# Patient Record
Sex: MALE | Race: WHITE | Employment: FULL TIME | URBAN - METROPOLITAN AREA
[De-identification: names, ages, dates, MRNs, and addresses within clinical notes are randomized per-mention and may not be internally consistent; named-entity substitution may affect disease eponyms.]

---

## 2018-01-08 ENCOUNTER — HOSPITAL ENCOUNTER (EMERGENCY)
Age: 53
Discharge: HOME | End: 2018-01-09
Attending: EMERGENCY MEDICINE
Payer: COMMERCIAL

## 2018-01-08 ENCOUNTER — APPOINTMENT (OUTPATIENT)
Dept: CT IMAGING | Age: 53
End: 2018-01-08
Attending: EMERGENCY MEDICINE
Payer: COMMERCIAL

## 2018-01-08 VITALS
RESPIRATION RATE: 18 BRPM | HEART RATE: 68 BPM | TEMPERATURE: 97 F | SYSTOLIC BLOOD PRESSURE: 123 MMHG | OXYGEN SATURATION: 100 % | DIASTOLIC BLOOD PRESSURE: 84 MMHG

## 2018-01-08 DIAGNOSIS — R10.13 ABDOMINAL PAIN, EPIGASTRIC: Primary | ICD-10-CM

## 2018-01-08 LAB
ALBUMIN SERPL-MCNC: 4.1 G/DL (ref 3.4–5)
ALBUMIN/GLOB SERPL: 1.3 {RATIO} (ref 0.8–1.7)
ALP SERPL-CCNC: 80 U/L (ref 45–117)
ALT SERPL-CCNC: 31 U/L (ref 16–61)
ANION GAP SERPL CALC-SCNC: 4 MMOL/L (ref 3–18)
AST SERPL-CCNC: 28 U/L (ref 15–37)
ATRIAL RATE: 50 BPM
BASOPHILS # BLD: 0 K/UL (ref 0–0.1)
BASOPHILS NFR BLD: 0 % (ref 0–2)
BILIRUB SERPL-MCNC: 0.3 MG/DL (ref 0.2–1)
BUN SERPL-MCNC: 27 MG/DL (ref 7–18)
BUN/CREAT SERPL: 32 (ref 12–20)
CALCIUM SERPL-MCNC: 8.6 MG/DL (ref 8.5–10.1)
CALCULATED P AXIS, ECG09: 25 DEGREES
CALCULATED R AXIS, ECG10: 58 DEGREES
CALCULATED T AXIS, ECG11: 37 DEGREES
CHLORIDE SERPL-SCNC: 105 MMOL/L (ref 100–108)
CO2 SERPL-SCNC: 31 MMOL/L (ref 21–32)
CREAT SERPL-MCNC: 0.85 MG/DL (ref 0.6–1.3)
DIAGNOSIS, 93000: NORMAL
DIFFERENTIAL METHOD BLD: ABNORMAL
EOSINOPHIL # BLD: 0.2 K/UL (ref 0–0.4)
EOSINOPHIL NFR BLD: 4 % (ref 0–5)
ERYTHROCYTE [DISTWIDTH] IN BLOOD BY AUTOMATED COUNT: 11.9 % (ref 11.6–14.5)
GLOBULIN SER CALC-MCNC: 3.1 G/DL (ref 2–4)
GLUCOSE SERPL-MCNC: 99 MG/DL (ref 74–99)
HCT VFR BLD AUTO: 38.6 % (ref 36–48)
HGB BLD-MCNC: 13.5 G/DL (ref 13–16)
LIPASE SERPL-CCNC: 125 U/L (ref 73–393)
LYMPHOCYTES # BLD: 1.6 K/UL (ref 0.9–3.6)
LYMPHOCYTES NFR BLD: 35 % (ref 21–52)
MCH RBC QN AUTO: 30.1 PG (ref 24–34)
MCHC RBC AUTO-ENTMCNC: 35 G/DL (ref 31–37)
MCV RBC AUTO: 86.2 FL (ref 74–97)
MONOCYTES # BLD: 0.4 K/UL (ref 0.05–1.2)
MONOCYTES NFR BLD: 9 % (ref 3–10)
NEUTS SEG # BLD: 2.3 K/UL (ref 1.8–8)
NEUTS SEG NFR BLD: 52 % (ref 40–73)
P-R INTERVAL, ECG05: 146 MS
PLATELET # BLD AUTO: 255 K/UL (ref 135–420)
PMV BLD AUTO: 10 FL (ref 9.2–11.8)
POTASSIUM SERPL-SCNC: 3.5 MMOL/L (ref 3.5–5.5)
PROT SERPL-MCNC: 7.2 G/DL (ref 6.4–8.2)
Q-T INTERVAL, ECG07: 456 MS
QRS DURATION, ECG06: 116 MS
QTC CALCULATION (BEZET), ECG08: 415 MS
RBC # BLD AUTO: 4.48 M/UL (ref 4.7–5.5)
SODIUM SERPL-SCNC: 140 MMOL/L (ref 136–145)
VENTRICULAR RATE, ECG03: 50 BPM
WBC # BLD AUTO: 4.5 K/UL (ref 4.6–13.2)

## 2018-01-08 PROCEDURE — 74011250636 HC RX REV CODE- 250/636: Performed by: EMERGENCY MEDICINE

## 2018-01-08 PROCEDURE — 99283 EMERGENCY DEPT VISIT LOW MDM: CPT

## 2018-01-08 PROCEDURE — 80053 COMPREHEN METABOLIC PANEL: CPT | Performed by: EMERGENCY MEDICINE

## 2018-01-08 PROCEDURE — 85025 COMPLETE CBC W/AUTO DIFF WBC: CPT | Performed by: EMERGENCY MEDICINE

## 2018-01-08 PROCEDURE — 74011636320 HC RX REV CODE- 636/320: Performed by: EMERGENCY MEDICINE

## 2018-01-08 PROCEDURE — 93005 ELECTROCARDIOGRAM TRACING: CPT

## 2018-01-08 PROCEDURE — 83690 ASSAY OF LIPASE: CPT | Performed by: EMERGENCY MEDICINE

## 2018-01-08 PROCEDURE — 74177 CT ABD & PELVIS W/CONTRAST: CPT

## 2018-01-08 RX ADMIN — IOPAMIDOL 100 ML: 612 INJECTION, SOLUTION INTRAVENOUS at 11:29

## 2018-01-08 RX ADMIN — SODIUM CHLORIDE 1000 ML: 900 INJECTION, SOLUTION INTRAVENOUS at 07:51

## 2018-01-08 NOTE — DISCHARGE INSTRUCTIONS
Abdominal Pain: Care Instructions  Your Care Instructions    Abdominal pain has many possible causes. Some aren't serious and get better on their own in a few days. Others need more testing and treatment. If your pain continues or gets worse, you need to be rechecked and may need more tests to find out what is wrong. You may need surgery to correct the problem. Don't ignore new symptoms, such as fever, nausea and vomiting, urination problems, pain that gets worse, and dizziness. These may be signs of a more serious problem. Your doctor may have recommended a follow-up visit in the next 8 to 12 hours. If you are not getting better, you may need more tests or treatment. The doctor has checked you carefully, but problems can develop later. If you notice any problems or new symptoms, get medical treatment right away. Follow-up care is a key part of your treatment and safety. Be sure to make and go to all appointments, and call your doctor if you are having problems. It's also a good idea to know your test results and keep a list of the medicines you take. How can you care for yourself at home? · Rest until you feel better. · To prevent dehydration, drink plenty of fluids, enough so that your urine is light yellow or clear like water. Choose water and other caffeine-free clear liquids until you feel better. If you have kidney, heart, or liver disease and have to limit fluids, talk with your doctor before you increase the amount of fluids you drink. · If your stomach is upset, eat mild foods, such as rice, dry toast or crackers, bananas, and applesauce. Try eating several small meals instead of two or three large ones. · Wait until 48 hours after all symptoms have gone away before you have spicy foods, alcohol, and drinks that contain caffeine. · Do not eat foods that are high in fat. · Avoid anti-inflammatory medicines such as aspirin, ibuprofen (Advil, Motrin), and naproxen (Aleve).  These can cause stomach upset. Talk to your doctor if you take daily aspirin for another health problem. When should you call for help? Call 911 anytime you think you may need emergency care. For example, call if:  ? · You passed out (lost consciousness). ? · You pass maroon or very bloody stools. ? · You vomit blood or what looks like coffee grounds. ? · You have new, severe belly pain. ?Call your doctor now or seek immediate medical care if:  ? · Your pain gets worse, especially if it becomes focused in one area of your belly. ? · You have a new or higher fever. ? · Your stools are black and look like tar, or they have streaks of blood. ? · You have unexpected vaginal bleeding. ? · You have symptoms of a urinary tract infection. These may include:  ¨ Pain when you urinate. ¨ Urinating more often than usual.  ¨ Blood in your urine. ? · You are dizzy or lightheaded, or you feel like you may faint. ? Watch closely for changes in your health, and be sure to contact your doctor if:  ? · You are not getting better after 1 day (24 hours). Where can you learn more? Go to http://dane-jaquan.info/. Enter Z433 in the search box to learn more about \"Abdominal Pain: Care Instructions. \"  Current as of: March 20, 2017  Content Version: 11.4  © 8857-0793 [employer]. Care instructions adapted under license by [employer] (which disclaims liability or warranty for this information). If you have questions about a medical condition or this instruction, always ask your healthcare professional. [place] [address] any warranty or liability for your use of this information.

## 2018-01-08 NOTE — ED TRIAGE NOTES
Pt stating he is having abd. Pain for the last 3 weeks. Pt stating that his pain has increased. Pt is AOX4; denies chest pain and SOB. Pt is ambulatory.

## 2018-01-08 NOTE — LETTER
NOTIFICATION RETURN TO WORK / SCHOOL 
 
1/8/2018 12:50 PM 
 
Mr. Carlos Mock [address] To Whom It May Concern: 
 
Carlos Mock is currently under the care of [facility] EMERGENCY DEPT. He will return to work/school on: 01/11/18 If there are questions or concerns please have the patient contact our office. Sincerely, Claire Kovacs 22., RN

## 2018-01-08 NOTE — ED PROVIDER NOTES
EMERGENCY DEPARTMENT HISTORY AND PHYSICAL EXAM    7:54 AM      Date: 1/8/2018  Patient Name: Dontrell Patten    History of Presenting Illness     No chief complaint on file. History Provided By: Patient    Chief Complaint: Abd pain  Duration:  Weeks  Timing:  Worsening   Location:    Quality: Dull  Severity: Mild  Modifying Factors: None  Associated Symptoms: abd bloating      Additional History (Context): Dontrell Patten is a 47 y.o. male who presents to the ED complaining of worsening dull abd pain that began 3 weeks ago. The patient is also complaining of associated abd bloating. He reports a history of pancreatitis and notes that his current pain feels like his previous pancreatitis but not as severe. He also reports a history of multiple abx surgeries including a cholecystectomy. Patient denies NVD,  Fever, urinary sx, testicular pain or swelling, h/o of kidney stones, and additional complaints or concerns. PCP: None        Past History     Past Medical History:  No past medical history on file. Past Surgical History:  No past surgical history on file. Family History:  No family history on file. Social History:  Social History   Substance Use Topics    Smoking status: Not on file    Smokeless tobacco: Not on file    Alcohol use Not on file       Allergies: Allergies   Allergen Reactions    Codeine Itching         Review of Systems       Review of Systems   Constitutional: Negative for fever. HENT: Negative for congestion. Respiratory: Negative for cough and shortness of breath. Cardiovascular: Negative for chest pain and leg swelling. Gastrointestinal: Positive for abdominal distention and abdominal pain. Negative for nausea and vomiting. Genitourinary: Negative for dysuria, flank pain, scrotal swelling and testicular pain. Neurological: Negative for light-headedness and headaches. All other systems reviewed and are negative.         Physical Exam     Visit Vitals    /84 (BP 1 Location: Left arm, BP Patient Position: At rest)    Pulse 68    Temp 97 °F (36.1 °C)    Resp 18    SpO2 100%         Physical Exam   Constitutional: He is oriented to person, place, and time. HENT:   Head: Atraumatic. Eyes: Conjunctivae are normal.   Neck: Neck supple. Cardiovascular: Normal rate, regular rhythm and normal heart sounds. Pulmonary/Chest: Effort normal and breath sounds normal. No respiratory distress. He exhibits no tenderness. Abdominal: Soft. Bowel sounds are normal. He exhibits distension. There is no tenderness. There is no rebound and no guarding. No cva ttp bilaterally   Musculoskeletal: Normal range of motion. He exhibits no edema or tenderness. Neurological: He is alert and oriented to person, place, and time. Skin: Skin is warm and dry. Psychiatric: He has a normal mood and affect. Nursing note and vitals reviewed. Diagnostic Study Results     Labs -  Recent Results (from the past 12 hour(s))   EKG, 12 LEAD, INITIAL    Collection Time: 01/08/18  8:47 AM   Result Value Ref Range    Ventricular Rate 50 BPM    Atrial Rate 50 BPM    P-R Interval 146 ms    QRS Duration 116 ms    Q-T Interval 456 ms    QTC Calculation (Bezet) 415 ms    Calculated P Axis 25 degrees    Calculated R Axis 58 degrees    Calculated T Axis 37 degrees    Diagnosis       Sinus bradycardia  Otherwise normal ECG  No previous ECGs available     CBC WITH AUTOMATED DIFF    Collection Time: 01/08/18  9:10 AM   Result Value Ref Range    WBC 4.5 (L) 4.6 - 13.2 K/uL    RBC 4.48 (L) 4.70 - 5.50 M/uL    HGB 13.5 13.0 - 16.0 g/dL    HCT 38.6 36.0 - 48.0 %    MCV 86.2 74.0 - 97.0 FL    MCH 30.1 24.0 - 34.0 PG    MCHC 35.0 31.0 - 37.0 g/dL    RDW 11.9 11.6 - 14.5 %    PLATELET 789 372 - 695 K/uL    MPV 10.0 9.2 - 11.8 FL    NEUTROPHILS 52 40 - 73 %    LYMPHOCYTES 35 21 - 52 %    MONOCYTES 9 3 - 10 %    EOSINOPHILS 4 0 - 5 %    BASOPHILS 0 0 - 2 %    ABS. NEUTROPHILS 2.3 1.8 - 8.0 K/UL    ABS. LYMPHOCYTES 1.6 0.9 - 3.6 K/UL    ABS. MONOCYTES 0.4 0.05 - 1.2 K/UL    ABS. EOSINOPHILS 0.2 0.0 - 0.4 K/UL    ABS. BASOPHILS 0.0 0.0 - 0.1 K/UL    DF AUTOMATED     METABOLIC PANEL, COMPREHENSIVE    Collection Time: 01/08/18  9:10 AM   Result Value Ref Range    Sodium 140 136 - 145 mmol/L    Potassium 3.5 3.5 - 5.5 mmol/L    Chloride 105 100 - 108 mmol/L    CO2 31 21 - 32 mmol/L    Anion gap 4 3.0 - 18 mmol/L    Glucose 99 74 - 99 mg/dL    BUN 27 (H) 7.0 - 18 MG/DL    Creatinine 0.85 0.6 - 1.3 MG/DL    BUN/Creatinine ratio 32 (H) 12 - 20      GFR est AA >60 >60 ml/min/1.73m2    GFR est non-AA >60 >60 ml/min/1.73m2    Calcium 8.6 8.5 - 10.1 MG/DL    Bilirubin, total 0.3 0.2 - 1.0 MG/DL    ALT (SGPT) 31 16 - 61 U/L    AST (SGOT) 28 15 - 37 U/L    Alk. phosphatase 80 45 - 117 U/L    Protein, total 7.2 6.4 - 8.2 g/dL    Albumin 4.1 3.4 - 5.0 g/dL    Globulin 3.1 2.0 - 4.0 g/dL    A-G Ratio 1.3 0.8 - 1.7     LIPASE    Collection Time: 01/08/18  9:10 AM   Result Value Ref Range    Lipase 125 73 - 393 U/L       Radiologic Studies -   CT ABD PELV W CONT   Final Result            Medical Decision Making   I am the first provider for this patient. I reviewed the vital signs, available nursing notes, past medical history, past surgical history, family history and social history. Vital Signs-Reviewed the patient's vital signs. EKG: Interpreted by the EP. Time Interpreted: 2457   Rate: 50   Rhythm: Sinus Bradycardia   Interpretation: no STEMI   Comparison:       Provider Notes (Medical Decision Making):   Pt presenting with abd pain, abdomen benign but extensive history of abd surgeries. Labs and imaging obtained and pt observed in ED for several hours. Repeat exams continued to be benign. I have discussed results of work up with patient. He has tolerated po, out pt GI given and Return precautions discussed. Patient stated verbal understanding and agrees with course and plan.          ED Course: Progress Notes, Reevaluation, and Consults:        Diagnosis     Clinical Impression:   1. Abdominal pain, epigastric        Disposition: Discharged home in stable condition      Follow-up Information     Follow up With Details Comments Contact Info    Rafiq Tian MD Call To schedule an appointment [facility] [address] [place] Call To assist with arranging a primary care provider [facility] Jerald Rey [address]    [facility] EMERGENCY DEPT  As needed, If symptoms worsen [address] [place] [id number]  [phone]           Patient's Medications    No medications on file     _______________________________    Attestations:  [facility] [employer] acting as a scribe for and in the presence of Ember Swan MD      January 08, 2018 at 7:54 AM       Provider Attestation:      I personally performed the services described in the documentation, reviewed the documentation, as recorded by the scribe in my presence, and it accurately and completely records my words and actions.  January 08, 2018 at 7:54 AM - Ember Swan MD    _______________________________

## 2018-01-10 ENCOUNTER — PATIENT OUTREACH (OUTPATIENT)
Dept: OTHER | Age: 53
End: 2018-01-10

## 2018-01-10 NOTE — PROGRESS NOTES
Initial MARIA A:   Patient on report as discharged from [facility] ED Visit 1/9/18 for Abdominal Pain, Epigastric. Initial attempt to contact patient for transitions of care.  Left discreet message on voicemail with this Care Coordinator's contact information.  Will attempt outreach on 1/11/18.        Call 911 anytime you think you may need emergency care. For example, call if:  ·You passed out (lost consciousness). ·You pass maroon or very bloody stools. ·You vomit blood or what looks like coffee grounds. ·You have new, severe belly pain. Call your doctor now or seek immediate medical care if:  ·Your pain gets worse, especially if it becomes focused in one area of your  belly. ·You have a new or higher fever. ·Your stools are black and look like tar, or they have streaks of blood. ·You have unexpected vaginal bleeding. ·You have symptoms of a urinary tract infection. These may include:  ¨Pain when you urinate. ¨Urinating more often than usual.  ¨Blood in your urine. ·You are dizzy or lightheaded, or you feel like you may faint.

## 2018-01-11 ENCOUNTER — PATIENT OUTREACH (OUTPATIENT)
Dept: OTHER | Age: 53
End: 2018-01-11

## 2018-01-11 NOTE — PROGRESS NOTES
Transition Of Care Note    Patient discharged from [facility] ED for Abdominal Pain, Epigastric. Medical History:   No past medical history on file. Care Manager contacted the patient by telephone to perform post 18(ED) discharge assessment. Verified  and zip code with patient as identifiers. Provided introduction to self, and explanation of the Nurse Care Manager role. *Spoke with Mr. Wilma Fernández and he states that he is feeling better. Still has some gas and a little discomfort. Rates pain as a mild 2 on 0-10 pain scale. * He said that he reported to the ED physician  a history of pancreatitis and  that his current pain feels like his previous pancreatitis episode but not as severe. Dr said  it was not his pancreas. Medication:   Performed medication reconciliation with patient, and patient verbalizes understanding of administration of home medications. There were no barriers to obtaining medications identified at this time. *Patient reports that he took Epsom Salt to help with gas, encouraged not to but to try other OTC medications. Mr. Wilma Fernández stated that he has tried Mylanta and he did not feel that it was strong enough. Support system:  patient and son    Discharge Instructions :  Reviewed discharge instructions with patient. Patient verbalizes understanding of discharge instructions and follow-up care. Red Flags:  Call 911 anytime you think you may need emergency care. For example, call if:  ·You passed out (lost consciousness). ·You pass maroon or very bloody stools. ·You vomit blood or what looks like coffee grounds. ·You have new, severe belly pain. Call your doctor now or seek immediate medical care if:  ·Your pain gets worse, especially if it becomes focused in one area of your  belly. ·You have a new or higher fever. ·Your stools are black and look like tar, or they have streaks of blood. ·You have unexpected vaginal bleeding.   ·You have symptoms of a urinary tract infection. These may include:  ¨Pain when you urinate. ¨Urinating more often than usual.  ¨Blood in your urine. ·You are dizzy or lightheaded, or you feel like you may faint. Advance Care Planning:   Patient was offered the opportunity to discuss advance care planning:  no     Does patient have an Advance Directive:  no   If no, did you provide information on Caring Connections?  no     PCP/Specialist follow up:   *Patient has an appointment with Loyde Heimlich - GI and Liver Specialist on Monday 1/15/18 @ 11:00 am.  Was hestant to go but encouraged him to keep appointment so that he could find out what may be going on., and to establish a relationship since he has pancreatic issues. *Mr Gasper Carr would like for this CC to assist in finding a new PCP he is very dissatisfied with the one that he has know. Will send information via his [employer]. Reviewed red flags with patient, and patient verbalizes understanding. Patient given an opportunity to ask questions. No other clinical/social/functional needs noted. The patient expressed thanks, offered no additional questions and ended the call.       Next outreach 1/25/18 - PCP f/u and GI visit

## 2018-01-18 ENCOUNTER — DOCUMENTATION ONLY (OUTPATIENT)
Dept: OTHER | Age: 53
End: 2018-01-18

## 2018-01-18 NOTE — PROGRESS NOTES
Patient requested help with finding a new PCP. Maude Lindsey works at [facility] so I found PCP's in that area. Information sent by [facility] email. Will f/u in 2 weeks to see if he had made an appointment or needs further assitance.

## 2018-01-31 ENCOUNTER — PATIENT OUTREACH (OUTPATIENT)
Dept: OTHER | Age: 53
End: 2018-01-31

## 2018-02-13 ENCOUNTER — PATIENT OUTREACH (OUTPATIENT)
Dept: OTHER | Age: 53
End: 2018-02-13

## 2018-02-13 NOTE — PROGRESS NOTES
Resolving current episode 1/9/18(Transitions of care complete). No further ED/UC or hospital admissions within 30 days post discharge. Patient did not attend follow-up appointment. Currently looking for another PCP. Previous PCP information given. No outreach from patient to [address] [place] [address]. Will f/u in a few months to see if patient contacted a new PCP.

## 2018-05-14 ENCOUNTER — PATIENT OUTREACH (OUTPATIENT)
Dept: OTHER | Age: 53
End: 2018-05-14

## 2018-05-14 NOTE — PROGRESS NOTES
Telephonic outreach to f/u with patient finding a PCP. Left discreet message on voicemail with this CC contact information.